# Patient Record
Sex: MALE | Race: WHITE | NOT HISPANIC OR LATINO
[De-identification: names, ages, dates, MRNs, and addresses within clinical notes are randomized per-mention and may not be internally consistent; named-entity substitution may affect disease eponyms.]

---

## 2021-06-29 ENCOUNTER — NON-APPOINTMENT (OUTPATIENT)
Age: 31
End: 2021-06-29

## 2021-06-29 ENCOUNTER — APPOINTMENT (OUTPATIENT)
Dept: ORTHOPEDIC SURGERY | Facility: CLINIC | Age: 31
End: 2021-06-29
Payer: COMMERCIAL

## 2021-06-29 VITALS — HEIGHT: 70 IN | BODY MASS INDEX: 27.92 KG/M2 | WEIGHT: 195 LBS

## 2021-06-29 DIAGNOSIS — S46.219A STRAIN OF MUSCLE, FASCIA AND TENDON OF OTHER PARTS OF BICEPS, UNSPECIFIED ARM, INITIAL ENCOUNTER: ICD-10-CM

## 2021-06-29 PROBLEM — Z00.00 ENCOUNTER FOR PREVENTIVE HEALTH EXAMINATION: Status: ACTIVE | Noted: 2021-06-29

## 2021-06-29 PROCEDURE — 99072 ADDL SUPL MATRL&STAF TM PHE: CPT

## 2021-06-29 PROCEDURE — 73080 X-RAY EXAM OF ELBOW: CPT | Mod: RT

## 2021-06-29 PROCEDURE — 99202 OFFICE O/P NEW SF 15 MIN: CPT

## 2021-06-29 NOTE — ADDENDUM
[FreeTextEntry1] : I, Torri Ron wrote this note acting as a scribe for Dr. Guy Vivar on Jun 29, 2021.\par \par \par

## 2021-06-29 NOTE — END OF VISIT
[FreeTextEntry3] : All medical record entries made by the Scribe were at my,  Dr. Guy Vivar MD., direction and personally dictated by me on 06/29/2021. I have personally reviewed the chart and agree that the record accurately reflects my personal performance of the history, physical exam, assessment and plan.\par

## 2021-06-29 NOTE — HISTORY OF PRESENT ILLNESS
[de-identified] : BOB LYON is a 31 year male who presents for initial evaluation of right arm pain. Patient states he was at the gym lifting weights a couple days ago. He was on his last set and felt a pop in his right biceps. He states it is not painful and he still has full range of motion. He denies having any sort of preliminary pain prior to the injury.He immediately noticed a deformity in his right biceps.

## 2021-06-29 NOTE — DISCUSSION/SUMMARY
[de-identified] : The underlying pathophysiology was reviewed with the patient. XR films were reviewed with the patient. Discussed at length the nature of the patient’s condition. The right arm symptoms appear secondary to biceps tear.\par \par I advised the patient that without surgery he will lose about 40% of strength. I am recommending surgical intervention at this time due to the age and level of activity of the patient. \par An MRI of the right elbow was ordered to further evaluate for complete distal biceps tendon rupture. Patient will follow-up to review the results.\par \par The underlying pathophysiology was reviewed with the patient. Treatment options were discussed including; surgical intervention. The patient wishes to proceed with distal biceps tendon repair at this time. The risks and benefits were reviewed with the patient. All of his questions were answered. He will meet with our surgical scheduler.		 		\par \par Patient can continue activities as tolerated. All questions answered, understanding verbalized. Patient in agreement with plan of care. Follow up in

## 2021-06-29 NOTE — PHYSICAL EXAM
[de-identified] : Patient is WDWN, alert, and in no acute distress. Breathing is unlabored. He is grossly oriented to person, place, \par and time.\par \par Right Arm:\par Biceps muscle proximally displaced\par no ecchymosis, mid edema.\par Hook Test: Positive\par FROM\par Normal sensation [de-identified] : AP, lateral and oblique views of the right elbow were obtained today and revealed no abnormaity.

## 2021-06-30 ENCOUNTER — APPOINTMENT (OUTPATIENT)
Dept: MRI IMAGING | Facility: CLINIC | Age: 31
End: 2021-06-30
Payer: COMMERCIAL

## 2021-06-30 ENCOUNTER — OUTPATIENT (OUTPATIENT)
Dept: OUTPATIENT SERVICES | Facility: HOSPITAL | Age: 31
LOS: 1 days | End: 2021-06-30
Payer: COMMERCIAL

## 2021-06-30 DIAGNOSIS — S46.219A STRAIN OF MUSCLE, FASCIA AND TENDON OF OTHER PARTS OF BICEPS, UNSPECIFIED ARM, INITIAL ENCOUNTER: ICD-10-CM

## 2021-06-30 PROCEDURE — 73221 MRI JOINT UPR EXTREM W/O DYE: CPT

## 2021-06-30 PROCEDURE — 73221 MRI JOINT UPR EXTREM W/O DYE: CPT | Mod: 26,RT

## 2021-07-01 ENCOUNTER — OUTPATIENT (OUTPATIENT)
Dept: OUTPATIENT SERVICES | Facility: HOSPITAL | Age: 31
LOS: 1 days | End: 2021-07-01
Payer: COMMERCIAL

## 2021-07-01 ENCOUNTER — NON-APPOINTMENT (OUTPATIENT)
Age: 31
End: 2021-07-01

## 2021-07-01 VITALS
TEMPERATURE: 98 F | WEIGHT: 192.9 LBS | DIASTOLIC BLOOD PRESSURE: 70 MMHG | HEART RATE: 62 BPM | HEIGHT: 68 IN | OXYGEN SATURATION: 99 % | SYSTOLIC BLOOD PRESSURE: 140 MMHG | RESPIRATION RATE: 14 BRPM

## 2021-07-01 DIAGNOSIS — S46.219A STRAIN OF MUSCLE, FASCIA AND TENDON OF OTHER PARTS OF BICEPS, UNSPECIFIED ARM, INITIAL ENCOUNTER: ICD-10-CM

## 2021-07-01 DIAGNOSIS — S46.209A UNSPECIFIED INJURY OF MUSCLE, FASCIA AND TENDON OF OTHER PARTS OF BICEPS, UNSPECIFIED ARM, INITIAL ENCOUNTER: ICD-10-CM

## 2021-07-01 DIAGNOSIS — Z01.818 ENCOUNTER FOR OTHER PREPROCEDURAL EXAMINATION: ICD-10-CM

## 2021-07-01 LAB — SARS-COV-2 RNA SPEC QL NAA+PROBE: DETECTED

## 2021-07-01 PROCEDURE — 87635 SARS-COV-2 COVID-19 AMP PRB: CPT

## 2021-07-01 PROCEDURE — G0463: CPT

## 2021-07-01 NOTE — H&P PST ADULT - NSICDXPASTMEDICALHX_GEN_ALL_CORE_FT
PAST MEDICAL HISTORY:  2019 novel coronavirus disease (COVID-19) 2/2021 Fever     PAST MEDICAL HISTORY:  2019 novel coronavirus disease (COVID-19) 2/2021 Fever    Injury of tendon of biceps right distal

## 2021-07-01 NOTE — H&P PST ADULT - HISTORY OF PRESENT ILLNESS
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 Reports right biceps pain with certain movement and activities . MRI was done . scheduled for right distal biceps repair on 7/2/21

## 2021-07-01 NOTE — H&P PST ADULT - ASSESSMENT
32 y/o male with right distal biceps injury . scheduled for right distal biceps repair on 7/2./21

## 2021-07-01 NOTE — H&P PST ADULT - NSICDXPROBLEM_GEN_ALL_CORE_FT
PROBLEM DIAGNOSES  Problem: Injury of tendon of biceps  Assessment and Plan: right distal biceps repair 7/2/21   prew op instructions   covid test today

## 2021-07-01 NOTE — H&P PST ADULT - NSANTHOSAYNRD_GEN_A_CORE
No. KHALIDA screening performed.  STOP BANG Legend: 0-2 = LOW Risk; 3-4 = INTERMEDIATE Risk; 5-8 = HIGH Risk

## 2021-07-02 PROBLEM — S46.209A: Chronic | Status: ACTIVE | Noted: 2021-07-01

## 2021-07-02 PROBLEM — U07.1 COVID-19: Chronic | Status: ACTIVE | Noted: 2021-07-01

## 2021-07-05 ENCOUNTER — TRANSCRIPTION ENCOUNTER (OUTPATIENT)
Age: 31
End: 2021-07-05

## 2021-07-05 ENCOUNTER — OUTPATIENT (OUTPATIENT)
Dept: OUTPATIENT SERVICES | Facility: HOSPITAL | Age: 31
LOS: 1 days | End: 2021-07-05
Payer: COMMERCIAL

## 2021-07-05 DIAGNOSIS — Z20.828 CONTACT WITH AND (SUSPECTED) EXPOSURE TO OTHER VIRAL COMMUNICABLE DISEASES: ICD-10-CM

## 2021-07-05 LAB — SARS-COV-2 RNA SPEC QL NAA+PROBE: SIGNIFICANT CHANGE UP

## 2021-07-05 PROCEDURE — U0003: CPT

## 2021-07-05 PROCEDURE — U0005: CPT

## 2021-07-06 ENCOUNTER — OUTPATIENT (OUTPATIENT)
Dept: OUTPATIENT SERVICES | Facility: HOSPITAL | Age: 31
LOS: 1 days | End: 2021-07-06
Payer: COMMERCIAL

## 2021-07-06 ENCOUNTER — APPOINTMENT (OUTPATIENT)
Dept: ORTHOPEDIC SURGERY | Facility: HOSPITAL | Age: 31
End: 2021-07-06

## 2021-07-06 DIAGNOSIS — S46.219A STRAIN OF MUSCLE, FASCIA AND TENDON OF OTHER PARTS OF BICEPS, UNSPECIFIED ARM, INITIAL ENCOUNTER: ICD-10-CM

## 2021-07-06 PROCEDURE — 36415 COLL VENOUS BLD VENIPUNCTURE: CPT

## 2021-07-06 PROCEDURE — 24342 REPAIR OF RUPTURED TENDON: CPT | Mod: RT

## 2021-07-06 PROCEDURE — 87635 SARS-COV-2 COVID-19 AMP PRB: CPT

## 2021-07-06 PROCEDURE — 36000 PLACE NEEDLE IN VEIN: CPT

## 2021-07-06 PROCEDURE — 99285 EMERGENCY DEPT VISIT HI MDM: CPT | Mod: 25

## 2021-07-06 PROCEDURE — 76000 FLUOROSCOPY <1 HR PHYS/QHP: CPT

## 2021-07-06 PROCEDURE — 93005 ELECTROCARDIOGRAM TRACING: CPT

## 2021-07-06 PROCEDURE — 85025 COMPLETE CBC W/AUTO DIFF WBC: CPT

## 2021-07-06 PROCEDURE — 80053 COMPREHEN METABOLIC PANEL: CPT

## 2021-07-06 PROCEDURE — C1713: CPT

## 2021-07-06 PROCEDURE — 71045 X-RAY EXAM CHEST 1 VIEW: CPT

## 2021-07-19 ENCOUNTER — APPOINTMENT (OUTPATIENT)
Dept: ORTHOPEDIC SURGERY | Facility: CLINIC | Age: 31
End: 2021-07-19
Payer: COMMERCIAL

## 2021-07-19 PROCEDURE — 99024 POSTOP FOLLOW-UP VISIT: CPT

## 2021-07-19 PROCEDURE — 73080 X-RAY EXAM OF ELBOW: CPT | Mod: RT

## 2021-07-19 PROCEDURE — 29105 APPLICATION LONG ARM SPLINT: CPT | Mod: 58,RT

## 2021-07-19 RX ORDER — IBUPROFEN 800 MG/1
800 TABLET, FILM COATED ORAL
Qty: 30 | Refills: 0 | Status: ACTIVE | COMMUNITY
Start: 2021-07-06

## 2021-07-19 RX ORDER — OXYCODONE 5 MG/1
5 TABLET ORAL
Qty: 12 | Refills: 0 | Status: ACTIVE | COMMUNITY
Start: 2021-07-06

## 2021-07-19 NOTE — HISTORY OF PRESENT ILLNESS
[de-identified] : Repair of right distal biceps tendon and application of long arm splint.\par  [de-identified] : The patient is a 31 year male who returns for the 1st postoperative visit after undergoing Repair of right distal biceps tendon and application of long arm splint at [facility]. The surgery was on 07/06/2021. The patient is recovering at home. He reports mild postoperative pain.  [de-identified] : Patient is WDWN, alert, and in no acute distress. Breathing is unlabored. He is grossly oriented to person, place, and time.\par \par Dissolvable sutures in place. Incision site is healing, no signs of infection. Normal amount of post-op edema and tenderness are present.\par \par Right Elbow:\par ROM: \par Pronation and supination is full\par  [de-identified] : AP, lateral and oblique views of the right elbow were obtained today and revealed 3 anchors in place.	  [de-identified] : A removable splint was applied and patient placed in a sling.\par Recommended to use Benadryl or the rash over the crease of the elbow.\par Patient may use Vitamin E oil on the scar.\par Patient advised he may begin gentle ROM exercises of the wrist with no more than 1-2 lb weights.\par Follow up in 4 weeks.

## 2021-07-19 NOTE — END OF VISIT
[FreeTextEntry3] : All medical record entries made by the Scribe were at my,  Dr. Guy Vivar MD., direction and personally dictated by me on 07/19/2021. I have personally reviewed the chart and agree that the record accurately reflects my personal performance of the history, physical exam, assessment and plan.\par

## 2021-07-19 NOTE — ADDENDUM
[FreeTextEntry1] : I, Torri Ron wrote this note acting as a scribe for Dr. Guy Vivar on Jul 19, 2021.\par \par \par

## 2021-08-16 ENCOUNTER — APPOINTMENT (OUTPATIENT)
Dept: ORTHOPEDIC SURGERY | Facility: CLINIC | Age: 31
End: 2021-08-16
Payer: COMMERCIAL

## 2021-08-16 PROCEDURE — 99024 POSTOP FOLLOW-UP VISIT: CPT

## 2021-08-16 NOTE — END OF VISIT
[FreeTextEntry3] : All medical record entries made by the Scribe were at my,  Dr. Guy Vivar MD., direction and personally dictated by me on 08/16/2021. I have personally reviewed the chart and agree that the record accurately reflects my personal performance of the history, physical exam, assessment and plan.

## 2021-08-16 NOTE — ADDENDUM
[FreeTextEntry1] : I, Torri Ron wrote this note acting as a scribe for Dr. Guy Vivar on Aug 16, 2021.\par \par

## 2021-08-16 NOTE — HISTORY OF PRESENT ILLNESS
[de-identified] : Repair of right distal biceps tendon and application of long arm splint.\par  [de-identified] : The patient is a 31 year male who returns for the 2nd postoperative visit after undergoing Repair of right distal biceps tendon and application of long arm splint at [facility]. The surgery was on 07/06/2021. He presents on 8/16/21 and reports he is feeling well. He has no complaints at this time [de-identified] : Patient is WDWN, alert, and in no acute distress. Breathing is unlabored. He is grossly oriented to person, place, and time.\par \par Incision site appears to be well healed. \par \par Right Elbow:\par ROM: 0-110\par Pronation and supination is full\par No complaints of numbness or tingling. [de-identified] : no imaging today [de-identified] : Discontinue use of sling. \par He was advised that he may begin light strengthening exercises of the bicep.\par He was advised that he is not yet cleared for WB activity.\par He deferred a script for PT at this time as he prefers to follow an at-home exercise program.\par Follow up in 2 months.

## 2021-10-18 ENCOUNTER — APPOINTMENT (OUTPATIENT)
Dept: ORTHOPEDIC SURGERY | Facility: CLINIC | Age: 31
End: 2021-10-18
Payer: COMMERCIAL

## 2021-10-18 VITALS
DIASTOLIC BLOOD PRESSURE: 76 MMHG | WEIGHT: 192 LBS | HEIGHT: 70 IN | SYSTOLIC BLOOD PRESSURE: 135 MMHG | BODY MASS INDEX: 27.49 KG/M2 | HEART RATE: 53 BPM

## 2021-10-18 DIAGNOSIS — S46.219D STRAIN OF MUSCLE, FASCIA AND TENDON OF OTHER PARTS OF BICEPS, UNSPECIFIED ARM, SUBSEQUENT ENCOUNTER: ICD-10-CM

## 2021-10-18 PROCEDURE — 99213 OFFICE O/P EST LOW 20 MIN: CPT

## 2021-10-18 PROCEDURE — 99072 ADDL SUPL MATRL&STAF TM PHE: CPT

## 2021-10-18 NOTE — PHYSICAL EXAM
[de-identified] : Patient is WDWN, alert, and in no acute distress. Breathing is unlabored. He is grossly oriented to person, place, and time.\par \par Incision site appears to be well healed. \par \par Right Elbow:\par ROM: 0-110\par Pronation and supination is full\par No complaints of numbness or tingling [de-identified] : no imaging today

## 2021-10-18 NOTE — DISCUSSION/SUMMARY
[de-identified] : The underlying pathophysiology was reviewed with the patient. Discussed at length the nature of the patient’s condition.\par \par He was once again advised to continue to use pain as his guide with regard to lifting and working out.\par Patient can continue activities as tolerated. \par \par All questions answered, understanding verbalized. Patient in agreement with plan of care. Follow up in 6 months.

## 2021-10-18 NOTE — ADDENDUM
[FreeTextEntry1] : I, Torri Ron wrote this note acting as a scribe for Dr. Guy Vivar on Oct 18, 2021.

## 2021-10-18 NOTE — HISTORY OF PRESENT ILLNESS
[de-identified] : The patient is a 31 year male who returns for a follow up visit after undergoing Repair of right distal biceps tendon and application of long arm splint at [facility]. The surgery was on 07/06/2021. He returns on 10/18/21 stating he has returned to heavy lifting of weights for the last three weeks. He has been performing myofascial release on himself to break up scar tissue. He states he is doing well overall and has no complaints. He denies numbness and tingling at this time.

## 2021-10-18 NOTE — END OF VISIT
[FreeTextEntry3] : All medical record entries made by the Scribe were at my,  Dr. Guy Vivar MD., direction and personally dictated by me on 10/18/2021. I have personally reviewed the chart and agree that the record accurately reflects my personal performance of the history, physical exam, assessment and plan.

## 2022-04-18 ENCOUNTER — APPOINTMENT (OUTPATIENT)
Dept: ORTHOPEDIC SURGERY | Facility: CLINIC | Age: 32
End: 2022-04-18